# Patient Record
Sex: FEMALE | Race: BLACK OR AFRICAN AMERICAN | NOT HISPANIC OR LATINO | URBAN - NONMETROPOLITAN AREA
[De-identification: names, ages, dates, MRNs, and addresses within clinical notes are randomized per-mention and may not be internally consistent; named-entity substitution may affect disease eponyms.]

---

## 2020-08-06 ENCOUNTER — OFFICE VISIT (OUTPATIENT)
Dept: URBAN - NONMETROPOLITAN AREA CLINIC 13 | Facility: CLINIC | Age: 65
End: 2020-08-06

## 2020-09-10 ENCOUNTER — OFFICE VISIT (OUTPATIENT)
Dept: URBAN - NONMETROPOLITAN AREA CLINIC 13 | Facility: CLINIC | Age: 65
End: 2020-09-10

## 2020-09-10 RX ORDER — LURASIDONE HYDROCHLORIDE 20 MG/1
TABLET, FILM COATED ORAL
Qty: 0 | Refills: 0 | Status: ACTIVE | COMMUNITY
Start: 1900-01-01 | End: 1900-01-01

## 2020-09-10 RX ORDER — HYDROXYCHLOROQUINE SULFATE 200 MG/1
TABLET ORAL
Qty: 0 | Refills: 0 | Status: ACTIVE | COMMUNITY
Start: 1900-01-01 | End: 1900-01-01

## 2020-09-10 RX ORDER — FOLIC ACID 1 MG/1
TAKE 1 TABLET (1 MG) BY ORAL ROUTE ONCE DAILY TABLET ORAL 1
Qty: 0 | Refills: 0 | Status: ACTIVE | COMMUNITY
Start: 1900-01-01 | End: 1900-01-01

## 2020-09-10 RX ORDER — ESCITALOPRAM 20 MG/1
TAKE 1 TABLET (20 MG) BY ORAL ROUTE ONCE DAILY TABLET, FILM COATED ORAL 1
Qty: 0 | Refills: 0 | Status: ACTIVE | COMMUNITY
Start: 1900-01-01 | End: 1900-01-01

## 2020-09-10 RX ORDER — GABAPENTIN 300 MG/1
CAPSULE ORAL
Qty: 0 | Refills: 0 | Status: ACTIVE | COMMUNITY
Start: 1900-01-01 | End: 1900-01-01

## 2020-09-10 RX ORDER — ROSUVASTATIN CALCIUM 10 MG/1
TABLET, FILM COATED ORAL
Qty: 0 | Refills: 0 | Status: ACTIVE | COMMUNITY
Start: 1900-01-01 | End: 1900-01-01

## 2020-09-10 RX ORDER — METHOTREXATE 2.5 MG/1
TABLET ORAL
Qty: 0 | Refills: 0 | Status: ACTIVE | COMMUNITY
Start: 1900-01-01 | End: 1900-01-01

## 2020-10-01 ENCOUNTER — OFFICE VISIT (OUTPATIENT)
Dept: URBAN - NONMETROPOLITAN AREA CLINIC 13 | Facility: CLINIC | Age: 65
End: 2020-10-01
Payer: COMMERCIAL

## 2020-10-01 ENCOUNTER — LAB OUTSIDE AN ENCOUNTER (OUTPATIENT)
Dept: URBAN - NONMETROPOLITAN AREA CLINIC 13 | Facility: CLINIC | Age: 65
End: 2020-10-01

## 2020-10-01 DIAGNOSIS — R63.4 WEIGHT LOSS: ICD-10-CM

## 2020-10-01 DIAGNOSIS — R14.0 BLOATING: ICD-10-CM

## 2020-10-01 DIAGNOSIS — C18.2 MALIGNANT NEOPLASM OF ASCENDING COLON: ICD-10-CM

## 2020-10-01 DIAGNOSIS — K21.9 GERD (GASTROESOPHAGEAL REFLUX DISEASE): ICD-10-CM

## 2020-10-01 DIAGNOSIS — K76.0 FATTY LIVER: ICD-10-CM

## 2020-10-01 DIAGNOSIS — R19.7 DIARRHEA: ICD-10-CM

## 2020-10-01 PROCEDURE — 3017F COLORECTAL CA SCREEN DOC REV: CPT | Performed by: INTERNAL MEDICINE

## 2020-10-01 PROCEDURE — G8484 FLU IMMUNIZE NO ADMIN: HCPCS | Performed by: INTERNAL MEDICINE

## 2020-10-01 PROCEDURE — 99214 OFFICE O/P EST MOD 30 MIN: CPT | Performed by: INTERNAL MEDICINE

## 2020-10-01 PROCEDURE — G9906 PT RECV TBCO CESS INTERV: HCPCS | Performed by: INTERNAL MEDICINE

## 2020-10-01 PROCEDURE — G8417 CALC BMI ABV UP PARAM F/U: HCPCS | Performed by: INTERNAL MEDICINE

## 2020-10-01 PROCEDURE — G8427 DOCREV CUR MEDS BY ELIG CLIN: HCPCS | Performed by: INTERNAL MEDICINE

## 2020-10-01 RX ORDER — HYDROXYCHLOROQUINE SULFATE 200 MG/1
TABLET ORAL
Qty: 0 | Refills: 0 | Status: ACTIVE | COMMUNITY
Start: 1900-01-01

## 2020-10-01 RX ORDER — METHOTREXATE 2.5 MG/1
TABLET ORAL
Qty: 0 | Refills: 0 | Status: ACTIVE | COMMUNITY
Start: 1900-01-01

## 2020-10-01 RX ORDER — FOLIC ACID 1 MG/1
TAKE 1 TABLET (1 MG) BY ORAL ROUTE ONCE DAILY TABLET ORAL 1
Qty: 0 | Refills: 0 | Status: ACTIVE | COMMUNITY
Start: 1900-01-01

## 2020-10-01 RX ORDER — GABAPENTIN 300 MG/1
CAPSULE ORAL
Qty: 0 | Refills: 0 | Status: ACTIVE | COMMUNITY
Start: 1900-01-01

## 2020-10-01 RX ORDER — ESCITALOPRAM 20 MG/1
TAKE 1 TABLET (20 MG) BY ORAL ROUTE ONCE DAILY TABLET, FILM COATED ORAL 1
Qty: 0 | Refills: 0 | Status: ACTIVE | COMMUNITY
Start: 1900-01-01

## 2020-10-01 RX ORDER — LURASIDONE HYDROCHLORIDE 20 MG/1
TABLET, FILM COATED ORAL
Qty: 0 | Refills: 0 | Status: ACTIVE | COMMUNITY
Start: 1900-01-01

## 2020-10-01 RX ORDER — FAMOTIDINE 20 MG/1
1 TABLET TABLET, FILM COATED ORAL BID
Qty: 180 TABLET | Refills: 3 | OUTPATIENT
Start: 2020-10-01

## 2020-10-01 RX ORDER — ROSUVASTATIN CALCIUM 10 MG/1
TABLET, FILM COATED ORAL
Qty: 0 | Refills: 0 | Status: ACTIVE | COMMUNITY
Start: 1900-01-01

## 2020-10-01 RX ORDER — METRONIDAZOLE 250 MG/1
1 TABLET TABLET, FILM COATED ORAL THREE TIMES A DAY
Qty: 30 TABLET | Refills: 5 | OUTPATIENT
Start: 2020-10-01 | End: 2020-11-29

## 2020-10-01 NOTE — HPI-TODAY'S VISIT:
Patient comes in for follow-up of history of colon cancer.  This was diagnosed in 2018 and she underwent a right hemicolectomy.  She did not require chemo or radiation. She describes about 3 loose bowel movements first thing in the morning.  Stools can be urgent and she has had occasional incontinence.  There is no evidence of bleeding.  She does have a fair amount of gas and some borborygmi.  Once she has had the 3 bowel movements in the morning she has none the rest of the day.  There is little in the way of abdominal pain.  Her appetite is okay but her weight is down 15 pounds without trying.  She is not on any new medicine. She does have frequent heartburn which is typically worse after eating.  It may be worse with lying down as well.  She has no dysphagia.  She has no extra esophageal symptoms.  She is taking Pepcid once in the morning.

## 2020-11-02 ENCOUNTER — OFFICE VISIT (OUTPATIENT)
Dept: URBAN - NONMETROPOLITAN AREA CLINIC 2 | Facility: CLINIC | Age: 65
End: 2020-11-02

## 2021-08-28 ENCOUNTER — TELEPHONE ENCOUNTER (OUTPATIENT)
Dept: URBAN - METROPOLITAN AREA CLINIC 13 | Facility: CLINIC | Age: 66
End: 2021-08-28

## 2021-08-29 ENCOUNTER — TELEPHONE ENCOUNTER (OUTPATIENT)
Dept: URBAN - METROPOLITAN AREA CLINIC 13 | Facility: CLINIC | Age: 66
End: 2021-08-29

## 2021-09-10 ENCOUNTER — LAB OUTSIDE AN ENCOUNTER (OUTPATIENT)
Dept: URBAN - NONMETROPOLITAN AREA CLINIC 2 | Facility: CLINIC | Age: 66
End: 2021-09-10

## 2021-09-10 ENCOUNTER — WEB ENCOUNTER (OUTPATIENT)
Dept: URBAN - NONMETROPOLITAN AREA CLINIC 2 | Facility: CLINIC | Age: 66
End: 2021-09-10

## 2021-09-10 ENCOUNTER — OFFICE VISIT (OUTPATIENT)
Dept: URBAN - NONMETROPOLITAN AREA CLINIC 2 | Facility: CLINIC | Age: 66
End: 2021-09-10
Payer: COMMERCIAL

## 2021-09-10 DIAGNOSIS — R63.4 WEIGHT LOSS: ICD-10-CM

## 2021-09-10 DIAGNOSIS — R19.7 DIARRHEA: ICD-10-CM

## 2021-09-10 DIAGNOSIS — Z12.11 COLON CANCER SCREENING: ICD-10-CM

## 2021-09-10 DIAGNOSIS — R14.0 BLOATING: ICD-10-CM

## 2021-09-10 DIAGNOSIS — Z85.038 PERSONAL HISTORY OF COLON CANCER: ICD-10-CM

## 2021-09-10 DIAGNOSIS — K21.9 GERD (GASTROESOPHAGEAL REFLUX DISEASE): ICD-10-CM

## 2021-09-10 DIAGNOSIS — K76.0 FATTY LIVER: ICD-10-CM

## 2021-09-10 DIAGNOSIS — C18.2 MALIGNANT NEOPLASM OF ASCENDING COLON: ICD-10-CM

## 2021-09-10 PROCEDURE — 99213 OFFICE O/P EST LOW 20 MIN: CPT | Performed by: NURSE PRACTITIONER

## 2021-09-10 RX ORDER — ESCITALOPRAM 20 MG/1
TAKE 1 TABLET (20 MG) BY ORAL ROUTE ONCE DAILY TABLET, FILM COATED ORAL 1
Qty: 0 | Refills: 0 | Status: ACTIVE | COMMUNITY
Start: 1900-01-01

## 2021-09-10 RX ORDER — FAMOTIDINE 20 MG/1
1 TABLET TABLET, FILM COATED ORAL BID
Qty: 180 TABLET | Refills: 3 | Status: ACTIVE | COMMUNITY
Start: 2020-10-01

## 2021-09-10 RX ORDER — HYDROXYCHLOROQUINE SULFATE 200 MG/1
TABLET ORAL
Qty: 0 | Refills: 0 | Status: ACTIVE | COMMUNITY
Start: 1900-01-01

## 2021-09-10 RX ORDER — ROSUVASTATIN CALCIUM 10 MG/1
TABLET, FILM COATED ORAL
Qty: 0 | Refills: 0 | Status: ON HOLD | COMMUNITY
Start: 1900-01-01

## 2021-09-10 RX ORDER — GABAPENTIN 300 MG/1
CAPSULE ORAL
Qty: 0 | Refills: 0 | Status: ACTIVE | COMMUNITY
Start: 1900-01-01

## 2021-09-10 RX ORDER — LURASIDONE HYDROCHLORIDE 20 MG/1
TABLET, FILM COATED ORAL
Qty: 0 | Refills: 0 | Status: ACTIVE | COMMUNITY
Start: 1900-01-01

## 2021-09-10 RX ORDER — SODIUM PICOSULFATE, MAGNESIUM OXIDE, AND ANHYDROUS CITRIC ACID 10; 3.5; 12 MG/160ML; G/160ML; G/160ML
160 ML LIQUID ORAL
Qty: 320 MILLILITER | Refills: 0 | OUTPATIENT
Start: 2021-09-10 | End: 2021-09-11

## 2021-09-10 RX ORDER — FOLIC ACID 1 MG/1
TAKE 1 TABLET (1 MG) BY ORAL ROUTE ONCE DAILY TABLET ORAL 1
Qty: 0 | Refills: 0 | Status: ACTIVE | COMMUNITY
Start: 1900-01-01

## 2021-09-10 RX ORDER — METHOTREXATE 2.5 MG/1
TABLET ORAL
Qty: 0 | Refills: 0 | Status: ON HOLD | COMMUNITY
Start: 1900-01-01

## 2021-09-10 NOTE — HPI-TODAY'S VISIT:
5/17/2019-Dr. Valenzuela  Pt states that GERD sx are gone with meds. She has no chest pain or dysphagia. There are no extra-esophageal sx. She has no issues with the meds.   There are no abdominal sx other than gas. It seems to have worsened since the colon surgery. She has borborygmi and flatus. She does drink milk and eat ice cream. She is not sure if she is lactose intolerant. She does not use artificial sugar. She says it does not bother her that much and she does not want to do anything about it.   BMs are normal. She has 3 formed stools in am. There is no diarrhea and no bleeding. She denies abdominal pain. Appetite and wt are stable.   9/10/21 Ms Carlyn Butler is a 64 YO F with hx of colon cancer who presents for colonoscopy.  Hx of right colectomy for colon cancer. post op hernia with bowel obstruction requiring second operation and devleoped post op renal failure requiring dialysis. No longer on dialysis. No mets and no chemo.  EGD 10/2018 reflux esophagitis and gastric bx c/w gastropathy. Colonoscopy 2/25/2018 2 polyps in the descending colon, diverticulosis of sigmoid, patent end to end ileo-colonic anastomosis at the hepatic flexure. Recommended to repeat in 3 years. She had abdominal pain at her visit last year. She had CT that was unremarkable but noted to have hepatic steatosis.  Today, she is doing well and has no acute complaints. TG

## 2021-10-03 PROBLEM — 89362005: Status: ACTIVE | Noted: 2020-10-01

## 2021-10-03 PROBLEM — 429699009: Status: ACTIVE | Noted: 2021-09-10

## 2021-11-30 ENCOUNTER — CLAIMS CREATED FROM THE CLAIM WINDOW (OUTPATIENT)
Dept: URBAN - METROPOLITAN AREA CLINIC 4 | Facility: CLINIC | Age: 66
End: 2021-11-30
Payer: COMMERCIAL

## 2021-11-30 ENCOUNTER — OFFICE VISIT (OUTPATIENT)
Dept: URBAN - NONMETROPOLITAN AREA SURGERY CENTER 1 | Facility: SURGERY CENTER | Age: 66
End: 2021-11-30
Payer: COMMERCIAL

## 2021-11-30 DIAGNOSIS — D12.4 ADENOMA OF DESCENDING COLON: ICD-10-CM

## 2021-11-30 DIAGNOSIS — D12.4 BENIGN NEOPLASM OF DESCENDING COLON: ICD-10-CM

## 2021-11-30 DIAGNOSIS — Z85.038 H/O COLON CANCER, STAGE I: ICD-10-CM

## 2021-11-30 PROCEDURE — 45385 COLONOSCOPY W/LESION REMOVAL: CPT | Performed by: INTERNAL MEDICINE

## 2021-11-30 PROCEDURE — G8907 PT DOC NO EVENTS ON DISCHARG: HCPCS | Performed by: INTERNAL MEDICINE

## 2021-11-30 PROCEDURE — 88305 TISSUE EXAM BY PATHOLOGIST: CPT | Performed by: PATHOLOGY

## 2023-06-20 ENCOUNTER — OFFICE VISIT (OUTPATIENT)
Dept: URBAN - NONMETROPOLITAN AREA CLINIC 13 | Facility: CLINIC | Age: 68
End: 2023-06-20

## 2023-07-11 ENCOUNTER — LAB OUTSIDE AN ENCOUNTER (OUTPATIENT)
Dept: URBAN - NONMETROPOLITAN AREA CLINIC 2 | Facility: CLINIC | Age: 68
End: 2023-07-11

## 2023-07-11 ENCOUNTER — OFFICE VISIT (OUTPATIENT)
Dept: URBAN - NONMETROPOLITAN AREA CLINIC 2 | Facility: CLINIC | Age: 68
End: 2023-07-11
Payer: COMMERCIAL

## 2023-07-11 ENCOUNTER — DASHBOARD ENCOUNTERS (OUTPATIENT)
Age: 68
End: 2023-07-11

## 2023-07-11 VITALS
WEIGHT: 160 LBS | HEIGHT: 63 IN | HEART RATE: 68 BPM | BODY MASS INDEX: 28.35 KG/M2 | SYSTOLIC BLOOD PRESSURE: 148 MMHG | DIASTOLIC BLOOD PRESSURE: 78 MMHG

## 2023-07-11 DIAGNOSIS — R19.7 DIARRHEA: ICD-10-CM

## 2023-07-11 DIAGNOSIS — K76.0 FATTY LIVER: ICD-10-CM

## 2023-07-11 DIAGNOSIS — C18.2 MALIGNANT NEOPLASM OF ASCENDING COLON: ICD-10-CM

## 2023-07-11 DIAGNOSIS — K21.9 GERD (GASTROESOPHAGEAL REFLUX DISEASE): ICD-10-CM

## 2023-07-11 PROCEDURE — 99214 OFFICE O/P EST MOD 30 MIN: CPT | Performed by: INTERNAL MEDICINE

## 2023-07-11 RX ORDER — HYDROXYCHLOROQUINE SULFATE 200 MG/1
TABLET ORAL
Qty: 0 | Refills: 0 | Status: ACTIVE | COMMUNITY
Start: 1900-01-01

## 2023-07-11 RX ORDER — FAMOTIDINE 20 MG/1
1 TABLET TABLET, FILM COATED ORAL TWICE A DAY
Qty: 180 TABLET | Refills: 3
Start: 2020-10-01

## 2023-07-11 RX ORDER — GABAPENTIN 300 MG/1
CAPSULE ORAL
Qty: 0 | Refills: 0 | Status: ACTIVE | COMMUNITY
Start: 1900-01-01

## 2023-07-11 RX ORDER — ESCITALOPRAM 20 MG/1
TAKE 1 TABLET (20 MG) BY ORAL ROUTE ONCE DAILY TABLET, FILM COATED ORAL 1
Qty: 0 | Refills: 0 | Status: ACTIVE | COMMUNITY
Start: 1900-01-01

## 2023-07-11 RX ORDER — FAMOTIDINE 20 MG/1
1 TABLET TABLET, FILM COATED ORAL BID
Qty: 180 TABLET | Refills: 3 | Status: ACTIVE | COMMUNITY
Start: 2020-10-01

## 2023-07-11 RX ORDER — LURASIDONE HYDROCHLORIDE 20 MG/1
TABLET, FILM COATED ORAL
Qty: 0 | Refills: 0 | Status: ACTIVE | COMMUNITY
Start: 1900-01-01

## 2023-07-11 RX ORDER — ROSUVASTATIN CALCIUM 10 MG/1
TABLET, FILM COATED ORAL
Qty: 0 | Refills: 0 | Status: ON HOLD | COMMUNITY
Start: 1900-01-01

## 2023-07-11 RX ORDER — METHOTREXATE 2.5 MG/1
TABLET ORAL
Qty: 0 | Refills: 0 | Status: ON HOLD | COMMUNITY
Start: 1900-01-01

## 2023-07-11 RX ORDER — FOLIC ACID 1 MG/1
TAKE 1 TABLET (1 MG) BY ORAL ROUTE ONCE DAILY TABLET ORAL 1
Qty: 0 | Refills: 0 | Status: ACTIVE | COMMUNITY
Start: 1900-01-01

## 2023-07-11 NOTE — HPI-TODAY'S VISIT:
7/11/2023 Carlyn returns to clinic for follow-up previously managed with Erika and Dr. Valenzuela.  She completed her screening colonoscopy 11/2021 with Dr. Valenzuela with a history of malignancy and right-sided colectomy, where 2 sessile polyps removed in the descending colon that were small in size resulted tubular adenoma on path.  She was also noted to have diverticulosis in the sigmoid colon.  She is due for repeat in 5 years for surveillance however this makes her nervous and she would like to repeat sooner we discussed possibly repeating in the fall 2024.  She is also nervous to attend this appointment.  She does have a history of fatty liver and does not have recent imaging.  She does get frequent lab work with Dr. Meyer and primary care Dr. Cole.  Her GERD symptoms are well controlled on Pepcid 20 mg twice daily.  Her diarrhea is stable at this point she is only having 2 bowel movements daily 1 in the morning and 1 in the evening.  Today we will order a right upper quadrant ultrasound to be completed at [facility], request labs from outside, repeat labs at follow-up.  I will have her come back in 6 months. She has a history of murmur and unknown heart condition from remote past and has not seen cardiology, requests referral to see Dr. Morris in [place].  SP

## 2023-09-29 ENCOUNTER — OFFICE VISIT (OUTPATIENT)
Dept: URBAN - NONMETROPOLITAN AREA CLINIC 13 | Facility: CLINIC | Age: 68
End: 2023-09-29

## 2023-09-29 RX ORDER — HYDROXYCHLOROQUINE SULFATE 200 MG/1
TABLET ORAL
Qty: 0 | Refills: 0 | Status: ACTIVE | COMMUNITY
Start: 1900-01-01

## 2023-09-29 RX ORDER — ESCITALOPRAM 20 MG/1
TAKE 1 TABLET (20 MG) BY ORAL ROUTE ONCE DAILY TABLET, FILM COATED ORAL 1
Qty: 0 | Refills: 0 | Status: ACTIVE | COMMUNITY
Start: 1900-01-01

## 2023-09-29 RX ORDER — FAMOTIDINE 20 MG/1
1 TABLET TABLET, FILM COATED ORAL TWICE A DAY
Qty: 180 TABLET | Refills: 3 | Status: ACTIVE | COMMUNITY
Start: 2020-10-01

## 2023-09-29 RX ORDER — FOLIC ACID 1 MG/1
TAKE 1 TABLET (1 MG) BY ORAL ROUTE ONCE DAILY TABLET ORAL 1
Qty: 0 | Refills: 0 | Status: ACTIVE | COMMUNITY
Start: 1900-01-01

## 2023-09-29 RX ORDER — ROSUVASTATIN CALCIUM 10 MG/1
TABLET, FILM COATED ORAL
Qty: 0 | Refills: 0 | Status: ON HOLD | COMMUNITY
Start: 1900-01-01

## 2023-09-29 RX ORDER — METHOTREXATE 2.5 MG/1
TABLET ORAL
Qty: 0 | Refills: 0 | Status: ON HOLD | COMMUNITY
Start: 1900-01-01

## 2023-09-29 RX ORDER — GABAPENTIN 300 MG/1
CAPSULE ORAL
Qty: 0 | Refills: 0 | Status: ACTIVE | COMMUNITY
Start: 1900-01-01

## 2023-09-29 RX ORDER — LURASIDONE HYDROCHLORIDE 20 MG/1
TABLET, FILM COATED ORAL
Qty: 0 | Refills: 0 | Status: ACTIVE | COMMUNITY
Start: 1900-01-01

## 2023-10-03 ENCOUNTER — TELEPHONE ENCOUNTER (OUTPATIENT)
Dept: URBAN - NONMETROPOLITAN AREA CLINIC 2 | Facility: CLINIC | Age: 68
End: 2023-10-03

## 2024-01-11 ENCOUNTER — OFFICE VISIT (OUTPATIENT)
Dept: URBAN - NONMETROPOLITAN AREA CLINIC 2 | Facility: CLINIC | Age: 69
End: 2024-01-11

## 2024-08-15 ENCOUNTER — OFFICE VISIT (OUTPATIENT)
Dept: URBAN - NONMETROPOLITAN AREA CLINIC 2 | Facility: CLINIC | Age: 69
End: 2024-08-15

## 2025-05-06 ENCOUNTER — OFFICE VISIT (OUTPATIENT)
Dept: URBAN - METROPOLITAN AREA TELEHEALTH 2 | Facility: TELEHEALTH | Age: 70
End: 2025-05-06

## 2025-05-06 ENCOUNTER — LAB OUTSIDE AN ENCOUNTER (OUTPATIENT)
Dept: URBAN - METROPOLITAN AREA TELEHEALTH 2 | Facility: TELEHEALTH | Age: 70
End: 2025-05-06

## 2025-05-06 ENCOUNTER — OFFICE VISIT (OUTPATIENT)
Dept: URBAN - NONMETROPOLITAN AREA CLINIC 13 | Facility: CLINIC | Age: 70
End: 2025-05-06

## 2025-05-06 ENCOUNTER — OFFICE VISIT (OUTPATIENT)
Dept: URBAN - METROPOLITAN AREA TELEHEALTH 2 | Facility: TELEHEALTH | Age: 70
End: 2025-05-06
Payer: COMMERCIAL

## 2025-05-06 DIAGNOSIS — R19.7 DIARRHEA: ICD-10-CM

## 2025-05-06 DIAGNOSIS — C18.2 MALIGNANT NEOPLASM OF ASCENDING COLON: ICD-10-CM

## 2025-05-06 DIAGNOSIS — Z85.038 PERSONAL HISTORY OF COLON CANCER: ICD-10-CM

## 2025-05-06 DIAGNOSIS — K21.9 GERD (GASTROESOPHAGEAL REFLUX DISEASE): ICD-10-CM

## 2025-05-06 DIAGNOSIS — K76.0 FATTY LIVER: ICD-10-CM

## 2025-05-06 DIAGNOSIS — Z12.11 COLON CANCER SCREENING: ICD-10-CM

## 2025-05-06 DIAGNOSIS — R01.1 HEART MURMUR: ICD-10-CM

## 2025-05-06 PROCEDURE — 99213 OFFICE O/P EST LOW 20 MIN: CPT | Performed by: NURSE PRACTITIONER

## 2025-05-06 RX ORDER — FAMOTIDINE 20 MG/1
1 TABLET AT BEDTIME TABLET, FILM COATED ORAL ONCE A DAY
Qty: 90 TABLET | Refills: 3
Start: 2025-05-06

## 2025-05-06 RX ORDER — FAMOTIDINE 20 MG/1
1 TABLET TABLET, FILM COATED ORAL TWICE A DAY
Qty: 180 TABLET | Refills: 3 | Status: ACTIVE | COMMUNITY
Start: 2025-05-06

## 2025-05-06 RX ORDER — LURASIDONE HYDROCHLORIDE 20 MG/1
TABLET, FILM COATED ORAL
Qty: 0 | Refills: 0 | Status: ACTIVE | COMMUNITY
Start: 1900-01-01

## 2025-05-06 RX ORDER — FOLIC ACID 1 MG/1
TAKE 1 TABLET (1 MG) BY ORAL ROUTE ONCE DAILY TABLET ORAL 1
Qty: 0 | Refills: 0 | Status: ACTIVE | COMMUNITY
Start: 1900-01-01

## 2025-05-06 RX ORDER — ESCITALOPRAM 20 MG/1
TAKE 1 TABLET (20 MG) BY ORAL ROUTE ONCE DAILY TABLET, FILM COATED ORAL 1
Qty: 0 | Refills: 0 | Status: ACTIVE | COMMUNITY
Start: 1900-01-01

## 2025-05-06 RX ORDER — OMEPRAZOLE 40 MG/1
1 CAPSULE 1/2 TO 1 HOUR BEFORE MORNING MEAL CAPSULE, DELAYED RELEASE ORAL ONCE A DAY
Qty: 90 | Refills: 3 | OUTPATIENT
Start: 2025-05-06

## 2025-05-06 RX ORDER — SODIUM, POTASSIUM,MAG SULFATES 17.5-3.13G
AS DIRECTED SOLUTION, RECONSTITUTED, ORAL ORAL
Qty: 1 | Refills: 0 | OUTPATIENT
Start: 2025-05-06 | End: 2025-05-08

## 2025-05-06 RX ORDER — GABAPENTIN 300 MG/1
CAPSULE ORAL
Qty: 0 | Refills: 0 | Status: ACTIVE | COMMUNITY
Start: 1900-01-01

## 2025-05-06 RX ORDER — FAMOTIDINE 20 MG/1
1 TABLET TABLET, FILM COATED ORAL TWICE A DAY
Qty: 180 TABLET | Refills: 3 | Status: ACTIVE | COMMUNITY
Start: 2020-10-01

## 2025-05-06 RX ORDER — METHOTREXATE 2.5 MG/1
TABLET ORAL
Qty: 0 | Refills: 0 | Status: ON HOLD | COMMUNITY
Start: 1900-01-01

## 2025-05-06 RX ORDER — ROSUVASTATIN CALCIUM 10 MG/1
TABLET, FILM COATED ORAL
Qty: 0 | Refills: 0 | Status: ON HOLD | COMMUNITY
Start: 1900-01-01

## 2025-05-06 RX ORDER — HYDROXYCHLOROQUINE SULFATE 200 MG/1
TABLET ORAL
Qty: 0 | Refills: 0 | Status: ACTIVE | COMMUNITY
Start: 1900-01-01

## 2025-05-06 RX ORDER — FAMOTIDINE 20 MG/1
1 TABLET TABLET, FILM COATED ORAL TWICE A DAY
Qty: 180 TABLET | Refills: 3
Start: 2025-05-06

## 2025-05-06 NOTE — HPI-TODAY'S VISIT:
5/6/2025 Ms. Carlyn Wyatt is a 69 year old female here for colon cancer screening. She was last seen in 2023 by Beatriz López NP. She has a hx of colon cancer and right sided colectomy. Her last colonoscopy was 11/2021 with 2 polyps removed.

## 2025-05-06 NOTE — HPI-TODAY'S VISIT:
5/6/2025 Ms. Carlyn Wyatt is a 69 year old female here for colon cancer screening. She was last seen in 2023 by Beatriz López NP. She has a hx of colon cancer and right sided colectomy. Her last colonoscopy was 11/2021 with 2 polyps removed. Since her last OV, she had back surgery. It is taking awhile to get over. She still uses a walker. She is having worsening reflux despite pepcid BID. She thinks her inability to chew contributes. She only has partials and she needs screw in teeth. She is having constipation and skipping days. She does not take anything for this. She has a lot of anxiety and does not wish to go to [employer]. CS

## 2025-05-06 NOTE — HPI-OTHER HISTORIES
5/17/2019-Dr. Valenzuela Pt states that GERD sx are gone with meds. She has no chest pain or dysphagia. There are no extra-esophageal sx. She has no issues with the meds.  There are no abdominal sx other than gas. It seems to have worsened since the colon surgery. She has borborygmi and flatus. She does drink milk and eat ice cream. She is not sure if she is lactose intolerant. She does not use artificial sugar. She says it does not bother her that much and she does not want to do anything about it.  BMs are normal. She has 3 formed stools in am. There is no diarrhea and no bleeding. She denies abdominal pain. Appetite and wt are stable.  9/10/21 Ms Carlyn Butler is a 64 YO F with hx of colon cancer who presents for colonoscopy. Hx of right colectomy for colon cancer. post op hernia with bowel obstruction requiring second operation and devleoped post op renal failure requiring dialysis. No longer on dialysis. No mets and no chemo. EGD 10/2018 reflux esophagitis and gastric bx c/w gastropathy. Colonoscopy 2/25/2018 2 polyps in the descending colon, diverticulosis of sigmoid, patent end to end ileo-colonic anastomosis at the hepatic flexure. Recommended to repeat in 3 years. She had abdominal pain at her visit last year. She had CT that was unremarkable but noted to have hepatic steatosis. Today, she is doing well and has no acute complaints. TG  7/11/2023 Carlyn returns to clinic for follow-up previously managed with Erika and Dr. Valenzuela. She completed her screening colonoscopy 11/2021 with Dr. Valenzuela with a history of malignancy and right-sided colectomy, where 2 sessile polyps removed in the descending colon that were small in size resulted tubular adenoma on path. She was also noted to have diverticulosis in the sigmoid colon. She is due for repeat in 5 years for surveillance however this makes her nervous and she would like to repeat sooner we discussed possibly repeating in the fall 2024. She is also nervous to attend this appointment. She does have a history of fatty liver and does not have recent imaging. She does get frequent lab work with Dr. Meyer and primary care Dr. Cole. Her GERD symptoms are well controlled on Pepcid 20 mg twice daily. Her diarrhea is stable at this point she is only having 2 bowel movements daily 1 in the morning and 1 in the evening. Today we will order a right upper quadrant ultrasound to be completed at [facility], request labs from outside, repeat labs at follow-up. I will have her come back in 6 months. She has a history of murmur and unknown heart condition from remote past and has not seen cardiology, requests referral to see Dr. Morris in [place]. SP

## 2025-05-06 NOTE — HPI-OTHER HISTORIES
5/17/2019-Dr. Valenzuela Pt states that GERD sx are gone with meds. She has no chest pain or dysphagia. There are no extra-esophageal sx. She has no issues with the meds.  There are no abdominal sx other than gas. It seems to have worsened since the colon surgery. She has borborygmi and flatus. She does drink milk and eat ice cream. She is not sure if she is lactose intolerant. She does not use artificial sugar. She says it does not bother her that much and she does not want to do anything about it.  BMs are normal. She has 3 formed stools in am. There is no diarrhea and no bleeding. She denies abdominal pain. Appetite and wt are stable.  9/10/21 Ms Carlyn Butler is a 66 YO F with hx of colon cancer who presents for colonoscopy. Hx of right colectomy for colon cancer. post op hernia with bowel obstruction requiring second operation and devleoped post op renal failure requiring dialysis. No longer on dialysis. No mets and no chemo. EGD 10/2018 reflux esophagitis and gastric bx c/w gastropathy. Colonoscopy 2/25/2018 2 polyps in the descending colon, diverticulosis of sigmoid, patent end to end ileo-colonic anastomosis at the hepatic flexure. Recommended to repeat in 3 years. She had abdominal pain at her visit last year. She had CT that was unremarkable but noted to have hepatic steatosis. Today, she is doing well and has no acute complaints. TG  7/11/2023 Carlyn returns to clinic for follow-up previously managed with Erika and Dr. Valenzuela. She completed her screening colonoscopy 11/2021 with Dr. Valenzuela with a history of malignancy and right-sided colectomy, where 2 sessile polyps removed in the descending colon that were small in size resulted tubular adenoma on path. She was also noted to have diverticulosis in the sigmoid colon. She is due for repeat in 5 years for surveillance however this makes her nervous and she would like to repeat sooner we discussed possibly repeating in the fall 2024. She is also nervous to attend this appointment. She does have a history of fatty liver and does not have recent imaging. She does get frequent lab work with Dr. Meyer and primary care Dr. Cole. Her GERD symptoms are well controlled on Pepcid 20 mg twice daily. Her diarrhea is stable at this point she is only having 2 bowel movements daily 1 in the morning and 1 in the evening. Today we will order a right upper quadrant ultrasound to be completed at [facility], request labs from outside, repeat labs at follow-up. I will have her come back in 6 months. She has a history of murmur and unknown heart condition from remote past and has not seen cardiology, requests referral to see Dr. Morris in [place]. SP

## 2025-06-25 ENCOUNTER — OFFICE VISIT (OUTPATIENT)
Dept: URBAN - NONMETROPOLITAN AREA SURGERY CENTER 1 | Facility: SURGERY CENTER | Age: 70
End: 2025-06-25

## 2025-07-11 ENCOUNTER — OFFICE VISIT (OUTPATIENT)
Dept: URBAN - NONMETROPOLITAN AREA CLINIC 13 | Facility: CLINIC | Age: 70
End: 2025-07-11

## 2025-07-16 ENCOUNTER — OFFICE VISIT (OUTPATIENT)
Dept: URBAN - NONMETROPOLITAN AREA CLINIC 13 | Facility: CLINIC | Age: 70
End: 2025-07-16

## 2025-07-16 RX ORDER — HYDROXYCHLOROQUINE SULFATE 200 MG/1
TABLET ORAL
Qty: 0 | Refills: 0 | Status: ACTIVE | COMMUNITY
Start: 1900-01-01

## 2025-07-16 RX ORDER — METHOTREXATE 2.5 MG/1
TABLET ORAL
Qty: 0 | Refills: 0 | Status: ON HOLD | COMMUNITY
Start: 1900-01-01

## 2025-07-16 RX ORDER — LURASIDONE HYDROCHLORIDE 20 MG/1
TABLET, FILM COATED ORAL
Qty: 0 | Refills: 0 | Status: ACTIVE | COMMUNITY
Start: 1900-01-01

## 2025-07-16 RX ORDER — GABAPENTIN 300 MG/1
CAPSULE ORAL
Qty: 0 | Refills: 0 | Status: ACTIVE | COMMUNITY
Start: 1900-01-01

## 2025-07-16 RX ORDER — FOLIC ACID 1 MG/1
TAKE 1 TABLET (1 MG) BY ORAL ROUTE ONCE DAILY TABLET ORAL 1
Qty: 0 | Refills: 0 | Status: ACTIVE | COMMUNITY
Start: 1900-01-01

## 2025-07-16 RX ORDER — ESCITALOPRAM 20 MG/1
TAKE 1 TABLET (20 MG) BY ORAL ROUTE ONCE DAILY TABLET, FILM COATED ORAL 1
Qty: 0 | Refills: 0 | Status: ACTIVE | COMMUNITY
Start: 1900-01-01

## 2025-07-16 RX ORDER — FAMOTIDINE 20 MG/1
1 TABLET AT BEDTIME TABLET, FILM COATED ORAL ONCE A DAY
Qty: 90 TABLET | Refills: 3
Start: 2025-07-16

## 2025-07-16 RX ORDER — FAMOTIDINE 20 MG/1
1 TABLET AT BEDTIME TABLET, FILM COATED ORAL ONCE A DAY
Qty: 90 TABLET | Refills: 3 | Status: ACTIVE | COMMUNITY
Start: 2025-05-06

## 2025-07-16 RX ORDER — OMEPRAZOLE 40 MG/1
1 CAPSULE 1/2 TO 1 HOUR BEFORE MORNING MEAL CAPSULE, DELAYED RELEASE ORAL ONCE A DAY
Qty: 90 | Refills: 3 | OUTPATIENT
Start: 2025-07-16

## 2025-07-16 RX ORDER — OMEPRAZOLE 40 MG/1
1 CAPSULE 1/2 TO 1 HOUR BEFORE MORNING MEAL CAPSULE, DELAYED RELEASE ORAL ONCE A DAY
Qty: 90 | Refills: 3 | Status: ACTIVE | COMMUNITY
Start: 2025-05-06

## 2025-07-16 RX ORDER — ROSUVASTATIN CALCIUM 10 MG/1
TABLET, FILM COATED ORAL
Qty: 0 | Refills: 0 | Status: ON HOLD | COMMUNITY
Start: 1900-01-01

## 2025-07-16 NOTE — HPI-TODAY'S VISIT:
7/16/2025 Ms. Carlyn Wyatt is here for f/u of GERD, constipaiton, and colon cancer screening. At her last OV, she was having worsening reflux despite pepcid BID and having constipation and skipping days. She was also due for a colonoscopy and this was scheduled for 6/25. She cancelled this. Today, she

## 2025-07-16 NOTE — HPI-OTHER HISTORIES
5/17/2019-Dr. Valenzuela Pt states that GERD sx are gone with meds. She has no chest pain or dysphagia. There are no extra-esophageal sx. She has no issues with the meds.  There are no abdominal sx other than gas. It seems to have worsened since the colon surgery. She has borborygmi and flatus. She does drink milk and eat ice cream. She is not sure if she is lactose intolerant. She does not use artificial sugar. She says it does not bother her that much and she does not want to do anything about it.  BMs are normal. She has 3 formed stools in am. There is no diarrhea and no bleeding. She denies abdominal pain. Appetite and wt are stable.  9/10/21 Ms Carlyn Butler is a 66 YO F with hx of colon cancer who presents for colonoscopy. Hx of right colectomy for colon cancer. post op hernia with bowel obstruction requiring second operation and devleoped post op renal failure requiring dialysis. No longer on dialysis. No mets and no chemo. EGD 10/2018 reflux esophagitis and gastric bx c/w gastropathy. Colonoscopy 2/25/2018 2 polyps in the descending colon, diverticulosis of sigmoid, patent end to end ileo-colonic anastomosis at the hepatic flexure. Recommended to repeat in 3 years. She had abdominal pain at her visit last year. She had CT that was unremarkable but noted to have hepatic steatosis. Today, she is doing well and has no acute complaints. TG  7/11/2023 Carlyn returns to clinic for follow-up previously managed with Erika and Dr. Valenzuela. She completed her screening colonoscopy 11/2021 with Dr. Valenzuela with a history of malignancy and right-sided colectomy, where 2 sessile polyps removed in the descending colon that were small in size resulted tubular adenoma on path. She was also noted to have diverticulosis in the sigmoid colon. She is due for repeat in 5 years for surveillance however this makes her nervous and she would like to repeat sooner we discussed possibly repeating in the fall 2024. She is also nervous to attend this appointment. She does have a history of fatty liver and does not have recent imaging. She does get frequent lab work with Dr. Meyer and primary care Dr. Cole. Her GERD symptoms are well controlled on Pepcid 20 mg twice daily. Her diarrhea is stable at this point she is only having 2 bowel movements daily 1 in the morning and 1 in the evening. Today we will order a right upper quadrant ultrasound to be completed at [facility], request labs from outside, repeat labs at follow-up. I will have her come back in 6 months. She has a history of murmur and unknown heart condition from remote past and has not seen cardiology, requests referral to see Dr. Morris in [place]. SP  5/6/2025 Ms. Carlyn Wyatt is a 69 year old female here for colon cancer screening. She was last seen in 2023 by Beatriz López NP. She has a hx of colon cancer and right sided colectomy. Her last colonoscopy was 11/2021 with 2 polyps removed. Since her last OV, she had back surgery. It is taking awhile to get over. She still uses a walker. She is having worsening reflux despite pepcid BID. She thinks her inability to chew contributes. She only has partials and she needs screw in teeth. She is having constipation and skipping days. She does not take anything for this. She has a lot of anxiety and does not wish to go to [employer]. CS

## 2025-08-20 ENCOUNTER — OFFICE VISIT (OUTPATIENT)
Dept: URBAN - NONMETROPOLITAN AREA SURGERY CENTER 1 | Facility: SURGERY CENTER | Age: 70
End: 2025-08-20